# Patient Record
Sex: MALE | Race: WHITE | NOT HISPANIC OR LATINO
[De-identification: names, ages, dates, MRNs, and addresses within clinical notes are randomized per-mention and may not be internally consistent; named-entity substitution may affect disease eponyms.]

---

## 2018-02-27 PROBLEM — Z00.00 ENCOUNTER FOR PREVENTIVE HEALTH EXAMINATION: Status: ACTIVE | Noted: 2018-02-27

## 2020-01-06 ENCOUNTER — APPOINTMENT (OUTPATIENT)
Dept: RADIATION ONCOLOGY | Facility: CLINIC | Age: 65
End: 2020-01-06
Payer: COMMERCIAL

## 2020-01-06 PROCEDURE — 31575 DIAGNOSTIC LARYNGOSCOPY: CPT

## 2020-01-06 PROCEDURE — 99214 OFFICE O/P EST MOD 30 MIN: CPT | Mod: 25

## 2020-01-07 VITALS
DIASTOLIC BLOOD PRESSURE: 85 MMHG | OXYGEN SATURATION: 95 % | TEMPERATURE: 98.6 F | BODY MASS INDEX: 35 KG/M2 | HEIGHT: 67 IN | RESPIRATION RATE: 12 BRPM | SYSTOLIC BLOOD PRESSURE: 159 MMHG | HEART RATE: 86 BPM | WEIGHT: 223 LBS

## 2020-01-07 RX ORDER — ASPIRIN/SOD BICARB/CITRIC ACID 324 MG
500 TABLET, EFFERVESCENT ORAL
Refills: 0 | Status: ACTIVE | COMMUNITY

## 2020-01-07 RX ORDER — RAMIPRIL 5 MG/1
CAPSULE ORAL
Refills: 0 | Status: ACTIVE | COMMUNITY

## 2020-01-07 RX ORDER — GLUCOSAMINE HCL/CHONDROITIN SU 500-400 MG
CAPSULE ORAL
Refills: 0 | Status: ACTIVE | COMMUNITY

## 2020-01-07 RX ORDER — AMLODIPINE BESYLATE 5 MG/1
TABLET ORAL
Refills: 0 | Status: ACTIVE | COMMUNITY

## 2020-01-07 RX ORDER — TAMSULOSIN HYDROCHLORIDE 0.4 MG/1
CAPSULE ORAL
Refills: 0 | Status: ACTIVE | COMMUNITY

## 2020-01-07 NOTE — VITALS
[Least Pain Intensity: 0/10] : 0/10 [Maximal Pain Intensity: 0/10] : 0/10 [100: Normal, no complaints, no evidence of disease.] : 100: Normal, no complaints, no evidence of disease.

## 2020-01-09 NOTE — PROCEDURE
[Normal] : normal pharyngeal walls [Pyriform Sinus Pooling Of Saliva Bilateral] : no pyriform sinus saliva pooling [Lesion(s)] : no lesions [de-identified] : Fiberoptic laryngoscopy was performed by Dr. Anthony through the left naris.  The left nasal passage and pharynx was anesthetized with topical benzocaine 20%. [de-identified] : The arytenoids were notable for mild edema likely associated with his current URI. [de-identified] : Both the right and left true vocal cords were visualized, mobile and without any nodularities or other abnormalities.

## 2020-01-09 NOTE — DISEASE MANAGEMENT
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

## 2020-01-09 NOTE — HISTORY OF PRESENT ILLNESS
[FreeTextEntry1] : Since last being seen in our office for routine follow-up in October 2019, Mr. Diego has been doing well overall.  He reports over the last week or 2 he developed symptoms of a cold with nasal congestion, postnasal drip, cough productive of clear phlegm and intermittent sore throat.  He reports that his wife also was recently diagnosed with an upper respiratory infection.  Both he and she had their flu vaccines the fall 2019.  He has no dysphagia or odynophagia.  He has no anorexia or weight loss.  He has good appetite.  He has no fatigue and he has no other complaints.

## 2020-07-17 PROBLEM — C32.0 VOCAL CORD CANCER: Status: ACTIVE | Noted: 2020-01-07

## 2020-07-20 ENCOUNTER — APPOINTMENT (OUTPATIENT)
Dept: RADIATION ONCOLOGY | Facility: CLINIC | Age: 65
End: 2020-07-20
Payer: COMMERCIAL

## 2020-07-20 VITALS
OXYGEN SATURATION: 97 % | BODY MASS INDEX: 34.53 KG/M2 | WEIGHT: 220 LBS | SYSTOLIC BLOOD PRESSURE: 173 MMHG | HEART RATE: 80 BPM | TEMPERATURE: 98 F | DIASTOLIC BLOOD PRESSURE: 93 MMHG | HEIGHT: 67 IN | RESPIRATION RATE: 14 BRPM

## 2020-07-20 DIAGNOSIS — C32.0 MALIGNANT NEOPLASM OF GLOTTIS: ICD-10-CM

## 2020-07-20 PROCEDURE — 99214 OFFICE O/P EST MOD 30 MIN: CPT | Mod: 25

## 2020-07-20 PROCEDURE — 31575 DIAGNOSTIC LARYNGOSCOPY: CPT

## 2020-07-20 NOTE — REVIEW OF SYSTEMS
[Mucositis Oral: Grade 0] : Mucositis Oral: Grade 0  [Oral Pain: Grade 0] : Oral Pain: Grade 0 [Salivary duct inflammation: Grade 0] : Salivary duct inflammation: Grade 0 [Dysgeusia: Grade 0] : Dysgeusia: Grade 0

## 2020-07-20 NOTE — VITALS
[Maximal Pain Intensity: 0/10] : 0/10 [Least Pain Intensity: 0/10] : 0/10 [90: Able to carry normal activity; minor signs or symptoms of disease.] : 90: Able to carry normal activity; minor signs or symptoms of disease.  [NoTreatment Scheduled] : no treatment scheduled [ECOG Performance Status: 0 - Fully active, able to carry on all pre-disease performance without restriction] : Performance Status: 0 - Fully active, able to carry on all pre-disease performance without restriction

## 2020-07-21 NOTE — HISTORY OF PRESENT ILLNESS
[FreeTextEntry1] : Mr. 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 \par \par Mr. Diego was diagnosed with right-sided vocal cord SCC in March 2019. He had a vocal cord biopsy (3/20/19) by Dr. Torres and pathology revealed well differentiated squamous cell carcinoma, at least in situ. It was determined to be invasive based on scope exam. PET/CT (4/3/19) had not demonstrated the know cancer. There was no evidence of metastatic cervical lymphadenopathy or distant metastatic disease. He received radiation to a total of 63 Gy, which he completed in May 2019. He was last seen on 1/6/20 for follow-up and his laryngoscopy was reported to be without any abnormalities. Mr. Diego presents today for his routine follow-up status post radiation therapy. He has no new complaints. He feels the quality of his voice has returned to normal. He has lost weight by staying active around the house during COVID. \par \par pt is here today for a f/u. He is feeling well. HE does get an occasional sensation on the right side but is having no difficulty swallowing and his voice is back to normal.

## 2020-07-21 NOTE — PROCEDURE
[Topical Lidocaine] : topical lidocaine [Flexible Endoscope] : examined with the flexible endoscope [Normal] : the true vocal cords ~T were normal [de-identified] : history of glottic cancer

## 2020-07-21 NOTE — DISEASE MANAGEMENT
[Clinical] : TNM Stage: c [I] : I [TTNM] : 1a [NTNM] : 0 [MTNM] : 0 [de-identified] : right vocal cord

## 2021-01-20 ENCOUNTER — APPOINTMENT (OUTPATIENT)
Dept: RADIATION ONCOLOGY | Facility: CLINIC | Age: 66
End: 2021-01-20